# Patient Record
Sex: MALE | Race: WHITE | NOT HISPANIC OR LATINO | URBAN - NONMETROPOLITAN AREA
[De-identification: names, ages, dates, MRNs, and addresses within clinical notes are randomized per-mention and may not be internally consistent; named-entity substitution may affect disease eponyms.]

---

## 2023-11-01 ENCOUNTER — HOSPITAL ENCOUNTER (EMERGENCY)
Facility: HOSPITAL | Age: 33
Discharge: HOME | End: 2023-11-01
Attending: EMERGENCY MEDICINE | Admitting: EMERGENCY MEDICINE
Payer: OTHER GOVERNMENT

## 2023-11-01 DIAGNOSIS — Z04.41 ENCOUNTER FOR EXAMINATION FOLLOWING ALLEGED RAPE IN ADULT: Primary | ICD-10-CM

## 2023-11-01 PROCEDURE — 99281 EMR DPT VST MAYX REQ PHY/QHP: CPT
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
